# Patient Record
Sex: FEMALE | Race: ASIAN | NOT HISPANIC OR LATINO | URBAN - METROPOLITAN AREA
[De-identification: names, ages, dates, MRNs, and addresses within clinical notes are randomized per-mention and may not be internally consistent; named-entity substitution may affect disease eponyms.]

---

## 2023-12-15 ENCOUNTER — APPOINTMENT (RX ONLY)
Dept: URBAN - METROPOLITAN AREA CLINIC 308 | Facility: CLINIC | Age: 58
Setting detail: DERMATOLOGY
End: 2023-12-15

## 2023-12-15 DIAGNOSIS — L81.4 OTHER MELANIN HYPERPIGMENTATION: ICD-10-CM

## 2023-12-15 PROBLEM — L81.9 DISORDER OF PIGMENTATION, UNSPECIFIED: Status: ACTIVE | Noted: 2023-12-15

## 2023-12-15 PROCEDURE — ? ADDITIONAL NOTES

## 2023-12-15 PROCEDURE — 11104 PUNCH BX SKIN SINGLE LESION: CPT

## 2023-12-15 PROCEDURE — ? BIOPSY BY PUNCH METHOD

## 2023-12-15 ASSESSMENT — LOCATION DETAILED DESCRIPTION DERM: LOCATION DETAILED: LEFT CLAVICULAR NECK

## 2023-12-15 ASSESSMENT — LOCATION ZONE DERM: LOCATION ZONE: NECK

## 2023-12-15 ASSESSMENT — LOCATION SIMPLE DESCRIPTION DERM: LOCATION SIMPLE: LEFT ANTERIOR NECK

## 2023-12-15 NOTE — PROCEDURE: ADDITIONAL NOTES
Detail Level: Simple
Render Risk Assessment In Note?: yes
Additional Notes: Patient's daughter at bedside translates as the patient is visiting from [place] and speaks [place].  She reports for hyperpigmentation on her face, neck, and chest.  Patient had a similar episode of hyperpigmentation 4 years ago.  Per daughter, the patient was seen back in [place] for this - had a lot of bloodwork done (all of which was reportedly normal, doesn't remember the tests) and the patient was prescribed a cream and oral medication x 1 month.  The patient states she used the medications as directed but noticed no change in her hyperpigmentation.  Per patient, the hyperpigmentation faded slightly (not back to normal skin color) spontaneously.  Patient reports about 2 months ago, she noted worsening of hyperpigmentation of her face, neck, and chest that has gotten progressively worse.  Denies pain, pruritus of the area.  Patient does wear SPF daily on her face, neck, and chest.  Patient does not take any medications on a regular basis.  Denies vitamin supplementation.  Was taking an herbal supplement called Samantha but has since stopped.  States she took an herbal remedy for her liver for a few days but started this 1 month ago, after her hyperpigmentation had already started to worsen.   On exam, hyperpigmentation is limited to the face, front of neck, and chest.  Patient's hands are without hyperpigmentation and patient's skin has no evidence of sun exposure.  \\n\\nOpted for biopsy per above.  Further plan pending biopsy results.

## 2023-12-15 NOTE — PROCEDURE: BIOPSY BY PUNCH METHOD
Detail Level: Detailed
Was A Bandage Applied: Yes
Punch Size In Mm: 4
Size Of Lesion In Cm (Optional): 0
Depth Of Punch Biopsy: dermis
Biopsy Type: H and E
Anesthesia Type: 1% lidocaine with epinephrine
Anesthesia Volume In Cc: 1
Hemostasis: None
Epidermal Sutures: 5-0 Prolene
Wound Care: Petrolatum
Dressing: pressure dressing
Suture Removal: 7 days
Path Notes (To The Dermatopathologist): Please look at pictures in [facility].
Path Notes Override (Will Replace All Of The Above Text): Please look at pictures in [facility].  Patient visiting from [place].  Hyperpigmentation x 4 years to face, neck, chest.  No chronic mediations.  Some herbal supplementation but reported dates of consumption don't align with worsening of hyperpigmentation.  Asymptomatic.
Consent: Verbal consent was obtained and risks were reviewed including but not limited to scarring, infection, bleeding, scabbing, incomplete removal, nerve damage and allergy to anesthesia.
Render Post-Care Instructions In Note?: no
Post-Care Instructions: I reviewed with the patient in detail post-care instructions. Patient is to keep the biopsy site dry overnight, and then apply bacitracin twice daily until healed. Patient may apply hydrogen peroxide soaks to remove any crusting.
Home Suture Removal Text: Patient was provided a home suture removal kit and will remove their sutures at home.  If they have any questions or difficulties they will call the office.
Notification Instructions: Patient will be notified of biopsy results. However, patient instructed to call the office if not contacted within 2 weeks.
Billing Type: Third-Party Bill
Information: Selecting Yes will display possible errors in your note based on the variables you have selected. This validation is only offered as a suggestion for you. PLEASE NOTE THAT THE VALIDATION TEXT WILL BE REMOVED WHEN YOU FINALIZE YOUR NOTE. IF YOU WANT TO FAX A PRELIMINARY NOTE YOU WILL NEED TO TOGGLE THIS TO 'NO' IF YOU DO NOT WANT IT IN YOUR FAXED NOTE.

## 2023-12-22 ENCOUNTER — APPOINTMENT (RX ONLY)
Dept: URBAN - METROPOLITAN AREA CLINIC 308 | Facility: CLINIC | Age: 58
Setting detail: DERMATOLOGY
End: 2023-12-22

## 2023-12-22 DIAGNOSIS — Z48.02 ENCOUNTER FOR REMOVAL OF SUTURES: ICD-10-CM

## 2023-12-22 PROCEDURE — ? SUTURE REMOVAL (GLOBAL PERIOD)

## 2023-12-22 PROCEDURE — 99024 POSTOP FOLLOW-UP VISIT: CPT

## 2023-12-22 ASSESSMENT — LOCATION ZONE DERM: LOCATION ZONE: NECK

## 2023-12-22 ASSESSMENT — LOCATION SIMPLE DESCRIPTION DERM: LOCATION SIMPLE: LEFT ANTERIOR NECK

## 2023-12-22 ASSESSMENT — LOCATION DETAILED DESCRIPTION DERM: LOCATION DETAILED: LEFT CLAVICULAR NECK

## 2023-12-22 NOTE — PROCEDURE: SUTURE REMOVAL (GLOBAL PERIOD)
Detail Level: Detailed
Add 91058 Cpt? (Important Note: In 2017 The Use Of 17714 Is Being Tracked By Cms To Determine Future Global Period Reimbursement For Global Periods): yes

## 2024-01-09 ENCOUNTER — RX ONLY (OUTPATIENT)
Age: 59
Setting detail: RX ONLY
End: 2024-01-09

## 2024-01-09 RX ORDER — TRIAMCINOLONE ACETONIDE 1 MG/G
OINTMENT TOPICAL
Qty: 30 | Refills: 1 | Status: ERX | COMMUNITY
Start: 2024-01-09

## 2024-01-09 RX ORDER — HYDROCORTISONE 25 MG/G
OINTMENT TOPICAL
Qty: 28.35 | Refills: 1 | Status: ERX | COMMUNITY
Start: 2024-01-09

## 2024-01-18 ENCOUNTER — RX ONLY (OUTPATIENT)
Age: 59
Setting detail: RX ONLY
End: 2024-01-18

## 2024-01-18 RX ORDER — TRIAMCINOLONE ACETONIDE 1 MG/G
OINTMENT TOPICAL
Qty: 30 | Refills: 0 | Status: ERX

## 2024-02-01 ENCOUNTER — RX ONLY (OUTPATIENT)
Age: 59
Setting detail: RX ONLY
End: 2024-02-01

## 2024-02-01 ENCOUNTER — APPOINTMENT (RX ONLY)
Dept: URBAN - METROPOLITAN AREA CLINIC 310 | Facility: CLINIC | Age: 59
Setting detail: DERMATOLOGY
End: 2024-02-01

## 2024-02-01 DIAGNOSIS — L43.8 OTHER LICHEN PLANUS: ICD-10-CM | Status: INADEQUATELY CONTROLLED

## 2024-02-01 PROCEDURE — ? PRESCRIPTION

## 2024-02-01 PROCEDURE — ? COUNSELING

## 2024-02-01 PROCEDURE — ? ADDITIONAL NOTES

## 2024-02-01 PROCEDURE — 99214 OFFICE O/P EST MOD 30 MIN: CPT

## 2024-02-01 RX ORDER — TRIAMCINOLONE ACETONIDE 1 MG/G
OINTMENT TOPICAL
Qty: 453.6 | Refills: 0 | Status: ERX

## 2024-02-01 RX ORDER — TRIAMCINOLONE ACETONIDE 1 MG/G
OINTMENT TOPICAL
Qty: 80 | Refills: 0 | Status: CANCELLED
Stop reason: SDUPTHER

## 2024-02-01 RX ORDER — HYDROCORTISONE 25 MG/G
OINTMENT TOPICAL
Qty: 56.7 | Refills: 1 | Status: ERX

## 2024-02-01 RX ORDER — TACROLIMUS 0.3 MG/G
OINTMENT TOPICAL
Qty: 60 | Refills: 2 | Status: ERX | COMMUNITY
Start: 2024-02-01

## 2024-02-01 RX ADMIN — TACROLIMUS: 0.3 OINTMENT TOPICAL at 00:00

## 2024-02-01 ASSESSMENT — LOCATION DETAILED DESCRIPTION DERM
LOCATION DETAILED: RIGHT INFERIOR ANTERIOR NECK
LOCATION DETAILED: MID POSTERIOR NECK
LOCATION DETAILED: LEFT INFERIOR MEDIAL FOREHEAD
LOCATION DETAILED: LEFT INFERIOR CENTRAL MALAR CHEEK
LOCATION DETAILED: RIGHT INFERIOR CENTRAL MALAR CHEEK

## 2024-02-01 ASSESSMENT — LOCATION ZONE DERM
LOCATION ZONE: NECK
LOCATION ZONE: FACE

## 2024-02-01 ASSESSMENT — LOCATION SIMPLE DESCRIPTION DERM
LOCATION SIMPLE: LEFT CHEEK
LOCATION SIMPLE: POSTERIOR NECK
LOCATION SIMPLE: RIGHT CHEEK
LOCATION SIMPLE: LEFT FOREHEAD
LOCATION SIMPLE: RIGHT ANTERIOR NECK

## 2024-02-01 ASSESSMENT — BSA RASH: BSA RASH: 8

## 2024-02-01 NOTE — PROCEDURE: ADDITIONAL NOTES
Detail Level: Simple
Render Risk Assessment In Note?: yes
Additional Notes: Biopsy proven at previous visit.  Patient has been using hydrocortisone bid to the face, triamcinolone to the chest and neck.  Does not note improvement.  Do think she has very minimal improvement today.  Discussed importance of sun protection.  Recommended stopping topical steroids x 3 weeks and starting tacrolimus ointment bid x 3 weeks then cycling through another round of topical steroids and tacrolimus.  Unfortunately, patient returns back to [facility] in 1.5 weeks and will be unable to follow-up.  Recommended she establish with a provider there for further guidance of treatment.  They are aware to not use the topical steroids for more than 3 weeks to prevent atrophy.

## 2024-02-01 NOTE — HPI: EVALUATION OF SKIN LESION(S)
Hpi Title: Evaluation of Skin Lesions
Have Your Spot(S) Been Treated In The Past?: has been treated
When Was It Treated?: 12/13/23
Additional History: Biopsy proven Lichen planus. Here for a 3 week follow up

## 2024-02-05 RX ORDER — TACROLIMUS 0.3 MG/G
OINTMENT TOPICAL
Qty: 60 | Refills: 2 | Status: CANCELLED

## 2024-02-07 RX ORDER — TACROLIMUS 0.3 MG/G
OINTMENT TOPICAL
Qty: 60 | Refills: 2 | Status: ERX